# Patient Record
Sex: FEMALE | Race: WHITE
[De-identification: names, ages, dates, MRNs, and addresses within clinical notes are randomized per-mention and may not be internally consistent; named-entity substitution may affect disease eponyms.]

---

## 2019-03-10 ENCOUNTER — HOSPITAL ENCOUNTER (EMERGENCY)
Dept: HOSPITAL 10 - FTE | Age: 19
Discharge: HOME | End: 2019-03-10
Payer: SELF-PAY

## 2019-03-10 ENCOUNTER — HOSPITAL ENCOUNTER (EMERGENCY)
Dept: HOSPITAL 91 - FTE | Age: 19
Discharge: HOME | End: 2019-03-10
Payer: SELF-PAY

## 2019-03-10 VITALS — BODY MASS INDEX: 33.27 KG/M2 | WEIGHT: 143.74 LBS | HEIGHT: 55 IN

## 2019-03-10 VITALS — HEART RATE: 71 BPM | RESPIRATION RATE: 20 BRPM | SYSTOLIC BLOOD PRESSURE: 114 MMHG | DIASTOLIC BLOOD PRESSURE: 76 MMHG
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
## 2019-03-10 DIAGNOSIS — S49.92XA: Primary | ICD-10-CM

## 2019-03-10 PROCEDURE — 99283 EMERGENCY DEPT VISIT LOW MDM: CPT

## 2019-03-10 PROCEDURE — 73030 X-RAY EXAM OF SHOULDER: CPT

## 2019-03-10 RX ADMIN — IBUPROFEN 1 MG: 600 TABLET ORAL at 18:57

## 2019-03-10 NOTE — ERD
ER Documentation


Chief Complaint


Chief Complaint





LEFT SHOULDER PAIN, POSSIBLE DISLOCATION





HPI


This is a 19-year-old female with no significant past medical history who is 


presenting with left shoulder pain while performing heavy lifting at work.  The 


patient reportedly was trying to move a cart from side to side when she felt a 


popping sensation in her shoulder.  The patient feels a bump to the shoulder and


feels that it does not look the same as her right shoulder.  The patient is able


to range her shoulder, but it is difficult secondary to pain.  She has no 


weakness or numbness or tingling to the left arm otherwise.  Her arm is not cold


or pale or cyanotic.  Her pulses are intact.





The patient denies feeling sick recently.  The patient denies fever or chills.  


The patient has had no headache or vision changes.  The patient does not endorse


neck or back pain. The patient denies lightheadedness or dizziness.  The patient


has had no chest pain or trouble breathing.  The patient denies nausea or 


vomiting. The patient denies abdominal pain. The patient denies changes to bowel


movements or urination.  The patient has had no focal deficits.  The patient has


had no weakness or numbness or tingling to the face or extremities.





ROS


All systems reviewed and are negative except as per history of present illness.





Allergies


Allergies:  


Coded Allergies:  


     No Known Allergy (Verified  Allergy, Unknown, 4/9/08)





PMhx/Soc


Medical and Surgical Hx:  pt denies Medical Hx, pt denies Surgical Hx


Hx Alcohol Use:  No


Hx Substance Use:  No


Hx Tobacco Use:  No


Smoking Status:  Never smoker





FmHx


Family History:  No diabetes





Physical Exam


Vitals





Vital Signs


  Date      Temp  Pulse  Resp  B/P (MAP)   Pulse Ox  O2          O2 Flow    FiO2


Time                                                 Delivery    Rate


   3/10/19  98.2     94    17      135/90       100


     18:20                          (105)





Physical Exam


Const:   No acute distress


Head:   Atraumatic 


Eyes:    Normal Conjunctiva


ENT:    Normal External Ears, Nose and Mouth.


Neck:   No meningismus.


Resp:   No respiratory distress.  Symmetric chest wall rise.


Cardio:   Regular rate.  Normal radial pulses.  No JVD.


Abd:    Non distended.


Skin:   No petechiae or rashes


Back:   No midline or flank tenderness


Ext:    No cyanosis, or edema.  Limited active range of motion secondary to 


pain, but full passive range of motion without obvious evidence of dislocation.


Neur:   Awake and alert.  No facial asymmetry.  Cranial nerves grossly intact.  


No obvious focal deficits.  Sensation and strength intact in the upper 


extremities bilaterally.


Psych:    Normal Mood and Affect


Results 24 hrs





Current Medications


 Medications
   Dose
          Sig/Hai
       Start Time
   Status  Last


 (Trade)       Ordered        Route
 PRN     Stop Time              Admin
Dose


                              Reason                                Admin


 Ibuprofen
     600 mg         ONCE  ONCE
    3/10/19       DC           3/10/19


(Motrin)                      PO
            19:00
                       18:57



                                             3/10/19 19:01








Procedures/MDM


MDM





The patient's presentation warrants further investigation. Previous medical 


records, if available, were reviewed.





IMAGING





Imaging and Radiology interpretation reviewed.





XR L Shoulder


FINDINGS:


Osseous structures:  appear well mineralized and intact with no fracture or 


destructive process identified.


Joint spaces:  The glenohumeral joint appears unremarkable. The left AC joint 


appears unremarkable.


Soft tissues:  appear unremarkable.


IMPRESSION: Unremarkable left shoulder.


Electronically viewed and signed by SALVADOR Kruger Physician on 03/10/2019 20:14 





TREATMENT/DISPOSITION





The patient presents with left shoulder pain.  There is no evidence of fracture 


or dislocation.  A soft tissue injury is certainly possible.  The patient was 


offered a sling.  I do suspect that her pain will be self-limited, but she may 


follow-up with her primary physician as needed.





The patient was treated with ibuprofen in the emergency department.





Upon reevaluation of the patient, symptoms have improved. No emergent diagnoses 


were identified. At this time, I feel that the patient stable for discharge.  


The patient was instructed to follow-up with a primary care physician in 1-3 


days.  The patient will be given strict precautions with which to return to the 


emergency department.





Prescriptions: Ibuprofen





Disclaimer: Inadvertent spelling and grammatical errors are likely due to 


EHR/dictation software use and do not reflect on the overall quality of patient 


care. Note that the electronic time recorded on this note does not necessarily 


reflect the actual time of the patient encounter.





Departure


Diagnosis:  


   Primary Impression:  


   Shoulder injury


   Encounter type:  initial encounter  Laterality:  left  Qualified Codes:  


   S49.92XA - Unspecified injury of left shoulder and upper arm, initial 


   encounter


Condition:  Stable


Patient Instructions:  Shoulder Pain (Uncertain Cause)





Additional Instructions:  


Thank you for for coming to [facility] for your care today. 


Please ask your nurse or provider if you have questions about your care today 


and do not leave until all your questions have been answered.  Please use any 


medications given as directed and follow-up with your doctor (or the doctor you 


were referred to) in the next 1-3 days. If you do not have a primary care doctor


you may follow up at the [facility] or [facility] clinic (listed below). You


may also use motrin and tylenol as needed for fever and/or pain unless 


instructed otherwise by your provider or nurse. Indications for more urgent 


follow-up have been discussed, but you may return to the Emergency Department at


ANY time for any worrisome or worsening symptoms.





If you have abdominal pain, please know that no test or exam you received is 


perfect and you should follow up within 8 hours for continued pain.





If you had any imaging studies today, such as an X-Ray or CT Scan, these studies


will be reviewed later by a radiologist. You will be called if there are 


important findings that were not identified today, so make sure the contact 


information you provided at registration is correct.





If you received any narcotic pain control medicine today, such as Vicodin, 


Morphine or Dilaudid, your coordination and judgment may be affected for a 


number of hours. Please do not drive or operate heavy machinery, and you may 


want someone to assist you at home. If you were given a prescription for 


narcotic medication, be aware that it is very addictive- use sparingly and only 


if necessary.





PLEASE SEEK FURTHER EVALUATION AND MANAGEMENT AT YOUR DOCTORS OFFICE WITHIN THE 


NEXT 1-3 DAYS. IT IS YOUR RESPONSIBILITY TO MAKE AN APPOINTMENT FOR FOLOW-UP 


CARE.





IF YOU HAVE A PRIMARY DOCTOR, PLEASE CALL THEIR OFFICE TO SCHEDULE AN 


APPOINTMENT FOR FOLLOW UP.





IF YOU DO NOT HAVE A PRIMARY DOCTOR YOU CAN CALL OUR PHYSICIAN REFERRAL HOTLINE 


AT [phone] 





IF YOU CAN NOT AFFORD TO SEE A PHYSICIAN YOU CAN CHOSE FROM THE FOLLOWING 


[facility] CLINICS:





[facility] [phone] [address] [facility] [phone] [address] [facility]. [facility] [phone] [address] [facility] [phone] [address] [facility] [phone] [address] [facility]. [facility]. [phone] [address] RAMON QUINTERO MD              Mar 10, 2019 20:23